# Patient Record
Sex: FEMALE | Race: WHITE | NOT HISPANIC OR LATINO | Employment: FULL TIME | URBAN - NONMETROPOLITAN AREA
[De-identification: names, ages, dates, MRNs, and addresses within clinical notes are randomized per-mention and may not be internally consistent; named-entity substitution may affect disease eponyms.]

---

## 2024-05-13 ENCOUNTER — OFFICE VISIT (OUTPATIENT)
Dept: URGENT CARE | Facility: CLINIC | Age: 39
End: 2024-05-13
Payer: COMMERCIAL

## 2024-05-13 VITALS
HEIGHT: 64 IN | TEMPERATURE: 97.7 F | HEART RATE: 86 BPM | DIASTOLIC BLOOD PRESSURE: 80 MMHG | BODY MASS INDEX: 38.76 KG/M2 | RESPIRATION RATE: 18 BRPM | SYSTOLIC BLOOD PRESSURE: 116 MMHG | OXYGEN SATURATION: 97 % | WEIGHT: 227 LBS

## 2024-05-13 DIAGNOSIS — H69.92 CHRONIC DYSFUNCTION OF LEFT EUSTACHIAN TUBE: ICD-10-CM

## 2024-05-13 DIAGNOSIS — J32.9 CHRONIC RHINOSINUSITIS: ICD-10-CM

## 2024-05-13 DIAGNOSIS — R05.3 CHRONIC COUGH: Primary | ICD-10-CM

## 2024-05-13 PROCEDURE — 99213 OFFICE O/P EST LOW 20 MIN: CPT | Performed by: PHYSICIAN ASSISTANT

## 2024-05-13 RX ORDER — RIZATRIPTAN BENZOATE 10 MG/1
10 TABLET ORAL
COMMUNITY
Start: 2024-03-29

## 2024-05-13 RX ORDER — MONTELUKAST SODIUM 10 MG/1
10 TABLET ORAL DAILY
Qty: 30 TABLET | Refills: 0 | Status: SHIPPED | OUTPATIENT
Start: 2024-05-13 | End: 2024-06-12

## 2024-05-13 RX ORDER — DULOXETIN HYDROCHLORIDE 30 MG/1
30 CAPSULE, DELAYED RELEASE ORAL DAILY
COMMUNITY
Start: 2023-05-29

## 2024-05-13 RX ORDER — IPRATROPIUM BROMIDE 42 UG/1
2 SPRAY, METERED NASAL 4 TIMES DAILY
Qty: 15 ML | Refills: 0 | Status: SHIPPED | OUTPATIENT
Start: 2024-05-13 | End: 2024-05-20

## 2024-05-13 RX ORDER — AZITHROMYCIN 250 MG/1
TABLET, FILM COATED ORAL
Qty: 6 TABLET | Refills: 0 | Status: SHIPPED | OUTPATIENT
Start: 2024-05-13 | End: 2024-05-18

## 2024-05-13 RX ORDER — DOXYCYCLINE 100 MG/1
TABLET ORAL
COMMUNITY
Start: 2023-06-05

## 2024-05-13 RX ORDER — ALBUTEROL SULFATE 90 UG/1
2 AEROSOL, METERED RESPIRATORY (INHALATION) EVERY 4 HOURS PRN
COMMUNITY
Start: 2024-04-03

## 2024-05-13 NOTE — PROGRESS NOTES
[facility] URGENT CARE   CARLOS NOTE:      Name: Amy Montano, 38 y.o.    CSN:[id number]   MRN:[id number]    PCP: No primary care provider on file.    ALL:    Allergies   Allergen Reactions    Amoxicillin Anaphylaxis       History:    Chief Complaint: Cough (COUGH x 2 MONTHS )    Encounter Date: 5/13/2024  18:25hrs    HPI: The history was obtained from the patient. Amy is a 38 y.o. female, who presents with a chief complaint of Cough (COUGH x 2 MONTHS )     Mentions she has been having this cough for about a year, she has been treated by her family physician in the past with this antibiotic which seems to be modestly improving, she denies any notable smell or taste changes, more recently in the past 2 months she is having increase in his coughing spells, normal PFT and discussion of using Flovent if needed.  Patient denies any hemoptysis, weight loss, night sweats, denies any gingival bleeding or swelling.  She is not yet to have a chest x-ray performed in the last year with this persistent/chronic cough.  She does note did endorse a tickle in the back of the throat which often causes her to cough, she also complains of sinus pressure primarily left-sided with some ear pressure and congestion with fullness often considered to be associated with fluid buildup.      Overall she is stating that she has a history of being exposed to mold in a apartment complex and [place], [place], she then states they moved to a form house with his carpet in the upper portions of split home, she does possess a dog as well as a cat.  She has a known history of allergies to cats.  She is adherent with her Zyrtec as well as her Flonase.    She denies any swelling of the legs arms or abdomen.    PMHx:    No past medical history on file.           Current Outpatient Medications   Medication Sig Dispense Refill    albuterol 90 mcg/actuation inhaler Inhale 2 puffs every 4 hours if needed.      doxycycline (Adoxa) 100 mg tablet  TAKE ONE TABLET BY MOUTH TWO TIMES A DAY FOR 10 DAYS      DULoxetine (Cymbalta) 30 mg DR capsule Take 1 capsule (30 mg) by mouth once daily.      rizatriptan (Maxalt) 10 mg tablet Take 1 tablet (10 mg) by mouth.      azithromycin (Zithromax) 250 mg tablet Take 2 tablets (500 mg) on  Day 1,  followed by 1 tablet (250 mg) once daily on Days 2 through 5. 6 tablet 0    ipratropium (Atrovent) 42 mcg (0.06 %) nasal spray Administer 2 sprays into each nostril 4 times a day for 7 days. 15 mL 0    montelukast (Singulair) 10 mg tablet Take 1 tablet (10 mg) by mouth once daily. 30 tablet 0     No current facility-administered medications for this visit.         PMSx:  No past surgical history on file.    Fam Hx: No family history on file.    SOC. Hx:     Social History     Socioeconomic History    Marital status: Single     Spouse name: Not on file    Number of children: Not on file    Years of education: Not on file    Highest education level: Not on file   Occupational History    Not on file   Tobacco Use    Smoking status: Not on file    Smokeless tobacco: Not on file   Substance and Sexual Activity    Alcohol use: Not on file    Drug use: Not on file    Sexual activity: Not on file   Other Topics Concern    Not on file   Social History Narrative    Not on file     Social Determinants of Health     Financial Resource Strain: Low Risk  (3/29/2024)    Received from [facility]    Overall Financial Resource Strain (CARDIA)     Difficulty of Paying Living Expenses: Not very hard   Food Insecurity: No Food Insecurity (3/29/2024)    Received from [facility]    Hunger Vital Sign     Worried About Running Out of Food in the Last Year: Never true     Ran Out of Food in the Last Year: Never true   Transportation Needs: No Transportation Needs (3/29/2024)    Received from [facility]    PRAPARE - Transportation     Lack of Transportation (Medical): No     Lack of Transportation (Non-Medical): No   Physical Activity:  Inactive (3/29/2024)    Received from [facility]    Exercise Vital Sign     Days of Exercise per Week: 0 days     Minutes of Exercise per Session: 0 min   Stress: Stress Concern Present (3/29/2024)    Received from [facility]    [employer] [place] [employer] - Occupational Stress Questionnaire     Feeling of Stress : To some extent   Social Connections: Moderately Isolated (3/29/2024)    Received from [facility]    Social Connection and Isolation Panel [NHANES]     Frequency of Communication with Friends and Family: More than three times a week     Frequency of Social Gatherings with Friends and Family: Once a week     Attends [employer] Services: Never     Active Member of Clubs or Organizations: No     Attends Club or Organization Meetings: Never     Marital Status: Living with partner   Intimate Partner Violence: Not on file   Housing Stability: Low Risk  (3/29/2024)    Received from [facility]    Housing Stability Vital Sign     Unable to Pay for Housing in the Last Year: No     Number of Places Lived in the Last Year: 2     Unstable Housing in the Last Year: No         Vitals:    05/13/24 1819   BP: 116/80   Pulse: 86   Resp: 18   Temp: 36.5 °C (97.7 °F)   SpO2: 97%     103 kg (227 lb)          Physical Exam  Vitals reviewed.   Constitutional:       Appearance: Normal appearance. She is obese.   HENT:      Head: Normocephalic and atraumatic.      Nose: Nose normal.      Mouth/Throat:      Mouth: Mucous membranes are moist.   Eyes:      Extraocular Movements: Extraocular movements intact.   Cardiovascular:      Rate and Rhythm: Normal rate and regular rhythm.   Pulmonary:      Effort: Pulmonary effort is normal.      Breath sounds: Normal breath sounds. No wheezing or rhonchi.      Comments: Transmitted abdominal sounds noted throughout the chest cavity.  Abdominal:      General: Abdomen is flat.   Musculoskeletal:         General: Normal range of motion.      Cervical back:  Normal range of motion and neck supple.   Skin:     General: Skin is warm.      Capillary Refill: Capillary refill takes less than 2 seconds.   Neurological:      Mental Status: She is alert and oriented to person, place, and time.   Psychiatric:         Behavior: Behavior normal.           LABORATORY @ RADIOLOGICAL IMAGING (if done):     2 view chest x-ray ordered will review results.    ____________________________________________________________________    I did personally review Amy's past medical history, surgical history, social history, as well as family history (when relevant).  In this case, I also oversaw the her drug management by reviewing her medication list, allergy list, as well as the medications that I prescribed during the UC course and/or recommended as an out-patient (including possible OTC medications such as acetaminophen, NSAIDs , etc).    After reviewing the items above, I did look at previous medical documentation, such as recent hospitalizations, office visits, and/or recent consultations with PCP/specialist.                          SDOH:   Another factor that I considered in Amy's care was her Social Determinants of Health (SDOH). During this UC encounter, she did not have social determinants of health. Those SDOH influencing Amy's care are: none      _____________________________________________________________________      UC COURSE/MEDICAL DECISION MAKING:    Amy is a 38 y.o., who presents with a working diagnosis of   1. Chronic cough    2. Chronic rhinosinusitis    3. Chronic dysfunction of left eustachian tube     with a differential to include: Influenza, parainfluenza, rhinovirus, adenovirus, metapneumovirus, coronavirus, COVID-19, postnasal drip, strep pharyngitis, GERD, retropharyngeal abscess, tonsillitis, adenitis, seasonal allergies, chronic sinusitis      Current plan is to add Singulair to treatment plan, I will hold off on introducing Flovent and maybe  allow GP to initiate if necessary.  Will order chest x-ray 2 view, patient had appropriate estimated PFTs, less likely some obstructive/restrictive condition could be extrapulmonary (ENT), consideration given to alpha-1 antitrypsin deficiency  Refer to ENT  Add ipratropium nasal spray to Flonase.  Add Singulair and milligrams  May consider fungal cause for condition, MRI from 10/20/2023 indicated paranasal sinuses/mastoid air cells are clear.        Adonay Lora PA-C   Advanced Practice Provider  [facility] URGENT CARE

## 2025-02-17 ENCOUNTER — APPOINTMENT (OUTPATIENT)
Dept: OBSTETRICS AND GYNECOLOGY | Facility: CLINIC | Age: 40
End: 2025-02-17

## 2025-02-17 VITALS
SYSTOLIC BLOOD PRESSURE: 112 MMHG | DIASTOLIC BLOOD PRESSURE: 78 MMHG | WEIGHT: 216 LBS | BODY MASS INDEX: 36.88 KG/M2 | HEIGHT: 64 IN

## 2025-02-17 DIAGNOSIS — N91.2 AMENORRHEA: Primary | ICD-10-CM

## 2025-02-17 LAB — CRL: 12.3 MM

## 2025-02-17 PROCEDURE — 3008F BODY MASS INDEX DOCD: CPT | Performed by: OBSTETRICS & GYNECOLOGY

## 2025-02-17 PROCEDURE — 99459 PELVIC EXAMINATION: CPT | Performed by: OBSTETRICS & GYNECOLOGY

## 2025-02-17 PROCEDURE — 99204 OFFICE O/P NEW MOD 45 MIN: CPT | Performed by: OBSTETRICS & GYNECOLOGY

## 2025-02-17 PROCEDURE — 76817 TRANSVAGINAL US OBSTETRIC: CPT | Performed by: OBSTETRICS & GYNECOLOGY

## 2025-02-17 PROCEDURE — 1036F TOBACCO NON-USER: CPT | Performed by: OBSTETRICS & GYNECOLOGY

## 2025-02-17 ASSESSMENT — PATIENT HEALTH QUESTIONNAIRE - PHQ9
1. LITTLE INTEREST OR PLEASURE IN DOING THINGS: NOT AT ALL
SUM OF ALL RESPONSES TO PHQ9 QUESTIONS 1 AND 2: 0
2. FEELING DOWN, DEPRESSED OR HOPELESS: NOT AT ALL

## 2025-02-17 ASSESSMENT — LIFESTYLE VARIABLES
HOW MANY STANDARD DRINKS CONTAINING ALCOHOL DO YOU HAVE ON A TYPICAL DAY: 1 OR 2
HOW OFTEN DO YOU HAVE SIX OR MORE DRINKS ON ONE OCCASION: LESS THAN MONTHLY
AUDIT-C TOTAL SCORE: 2
HOW OFTEN DO YOU HAVE A DRINK CONTAINING ALCOHOL: MONTHLY OR LESS
SKIP TO QUESTIONS 9-10: 0

## 2025-02-17 ASSESSMENT — PAIN SCALES - GENERAL: PAINLEVEL_OUTOF10: 0-NO PAIN

## 2025-02-17 NOTE — PROGRESS NOTES
Amy Montano is a 39 y.o. year old female patient.  PCP = No Assigned PCP Generic Provider, MD    Chief Complaint   Patient presents with    New Patient Visit     New patient here due to amenorrhea. Patient states her last period was around  to , she is unsure. She c/o nausea, vomiting, being lightheaded, having mild cramps and breast tenderness. Patient denies any vaginal bleeding. Patient did have a positive urine pregnancy test at her PCP.       HPI   Presents stating that she has not had a menstrual flow since December.  She has some nausea and vomiting and mild cramps and breast tenderness.  Denies any vaginal bleeding or abdominal pain.    OB History          2    Para   1    Term   1            AB   1    Living   1         SAB   1    IAB        Ectopic        Multiple        Live Births   1                 Past Medical History:   Diagnosis Date    Migraines        Past Surgical History:   Procedure Laterality Date     SECTION, LOW TRANSVERSE  2016       Review of Systems:   Constitutional: No fever or chills  Respiratory: No shortness of breath, or cough  Cardiovascular: No chest pain or syncope  Breasts: No masses, no nipple discharge  Gastrointestinal: No diarrhea, no abdominal pain  Genitourinary: No dysuria or frequency  Gynecology: Negative except as noted in history of present illness  All other: All other systems reviewed and negative for complaint    Medication Documentation Review Audit       Reviewed by Davian Grissom MD (Physician) on 25 at 1108      Medication Order Taking? Sig Documenting Provider Last Dose Status   albuterol 90 mcg/actuation inhaler [id number] Yes Inhale 2 puffs every 4 hours if needed. Historical Provider, MD  Active   DOCOSAHEXAENOIC ACID ORAL [id number] Yes Take 1 capsule by mouth once daily. Historical Provider, MD  Active   doxycycline (Adoxa) 100 mg tablet [id number]  TAKE ONE TABLET BY MOUTH TWO TIMES A DAY FOR 10 DAYS  "  Patient not taking: Reported on 2025    Historical Provider, MD  Active   DULoxetine (Cymbalta) 30 mg DR capsule   Take 1 capsule (30 mg) by mouth once daily.   Patient not taking: Reported on 2025    Historical Provider, MD  Active   ipratropium (Atrovent) 42 mcg (0.06 %) nasal spray   Administer 2 sprays into each nostril 4 times a day for 7 days.   Patient not taking: Reported on 2025    Adonay Lora PA-C   24 2359   montelukast (Singulair) 10 mg tablet   Take 1 tablet (10 mg) by mouth once daily.   Patient not taking: Reported on 2025    Adonay Lora PA-C   24 2359   rizatriptan (Maxalt) 10 mg tablet   Take 1 tablet (10 mg) by mouth.   Patient not taking: Reported on 2025    Historical Provider, MD  Active                     /78   Ht 1.626 m (5' 4\")   Wt 98 kg (216 lb)   LMP 2024 (Approximate)   BMI 37.08 kg/m²     PHYSICAL EXAMINATION:  Chaperone present for exam: Cecile Russell, LPN  Well-developed, well nourished, in no acute distress, alert and oriented x three, is pleasant and cooperative.  HEENT: Clear. Pupils equal, round and reactive to light and accommodation. Extraocular muscles are intact. Oral mucosa pink without exudate.   NECK: No lymphadenopathy, no thyromegaly.  LUNGS: Clear bilaterally.  HEART: Regular rate and rhythm without murmurs.  ABDOMEN: Normoactive bowel sounds, soft and nontender, no guarding or rebound tenderness, no CVA tenderness.  EXTREMITIES: No clubbing, cyanosis or edema.  NEUROLOGIC:  Cranial nerves II-XII grossly intact.  :  Normal external female genitalia, normal vulva, normal vagina. Normal urethral meatus, urethra and bladder.  Vaginal ultrasound shows a live intrauterine pregnancy at 7 weeks 3 days gestation which is consistent with LMP. EDC is 2025.      Problem List Items Addressed This Visit    None  Visit Diagnoses       Amenorrhea    -  " Primary    Relevant Orders    US OB transvaginal (Completed)             Provider Impression:  1.  Amenorrhea  Patient offered to be given Phenergan for her nausea which she declines at this time.  Follow-up in 4 weeks for new OB visit, cultures and prenatal labs.

## 2025-03-17 ENCOUNTER — APPOINTMENT (OUTPATIENT)
Facility: CLINIC | Age: 40
End: 2025-03-17
Payer: COMMERCIAL

## 2025-03-17 VITALS — SYSTOLIC BLOOD PRESSURE: 116 MMHG | WEIGHT: 214.6 LBS | BODY MASS INDEX: 36.84 KG/M2 | DIASTOLIC BLOOD PRESSURE: 68 MMHG

## 2025-03-17 DIAGNOSIS — Z12.4 ENCOUNTER FOR SCREENING FOR CERVICAL CANCER: ICD-10-CM

## 2025-03-17 DIAGNOSIS — Z11.3 SCREEN FOR STD (SEXUALLY TRANSMITTED DISEASE): ICD-10-CM

## 2025-03-17 DIAGNOSIS — Z01.419 ENCOUNTER FOR ANNUAL ROUTINE GYNECOLOGICAL EXAMINATION: ICD-10-CM

## 2025-03-17 DIAGNOSIS — Z3A.11 11 WEEKS GESTATION OF PREGNANCY (HHS-HCC): Primary | ICD-10-CM

## 2025-03-17 PROCEDURE — 0500F INITIAL PRENATAL CARE VISIT: CPT | Performed by: OBSTETRICS & GYNECOLOGY

## 2025-03-17 PROCEDURE — 88175 CYTOPATH C/V AUTO FLUID REDO: CPT

## 2025-03-17 NOTE — PROGRESS NOTES
Subjective   Patient ID [id number]   Amy Montano is a 39 y.o.  at 11w5d with a working estimated date of delivery of 10/1/2025, by Last Menstrual Period who presents for an initial prenatal visit.     Chief Complaint   Patient presents with    Initial Prenatal Visit     Patient has no concerns at this time.        Her pregnancy is complicated by:  She had previous  section.    OB History    Para Term  AB Living   3 1 1   1 1   SAB IAB Ectopic Multiple Live Births   1       1      # Outcome Date GA Lbr Tony/2nd Weight Sex Type Anes PTL Lv   3 Current            2 SAB 2018           1 Term 16 40w0d   M CS-LTranv   CACHORRO          Review of Systems:   Constitutional: No fever or chills  Respiratory: No shortness of breath, or cough  Cardiovascular: No chest pain or syncope  Breasts: No masses, no nipple discharge  Gastrointestinal: No diarrhea, no abdominal pain  Genitourinary: No dysuria or frequency  Gynecology: Negative except as noted in history of present illness  All other: All other systems reviewed and negative for complaint    Objective   Chaperone present for exam: Cecile Russell LPN    Physical Exam  Weight: 97.3 kg (214 lb 9.6 oz)  Expected Total Weight Gain: 5 kg (11 lb)-9 kg (19 lb)   Pregravid BMI: 37.06  BP: 116/68    Fetal Heart Rate: 150     OBGyn Exam  Well-developed, well nourished, in no acute distress, alert and oriented x three, is pleasant and cooperative.  HEENT: Clear. Pupils equal, round and reactive to light and accommodation. Extraocular muscles are intact. Oral mucosa pink without exudate.   NECK: No lymphadenopathy, no thyromegaly.  LUNGS: Clear bilaterally.  HEART: Regular rate and rhythm without murmurs.  ABDOMEN: Normoactive bowel sounds, soft and nontender, no guarding or rebound tenderness, no CVA tenderness.  EXTREMITIES: No clubbing, cyanosis or edema.  NEUROLOGIC:  Cranial nerves II-XII grossly intact.  :  Normal external female genitalia,  normal vulva, normal vagina. Normal urethral meatus, urethra and bladder. Normal appearing cervix.  Approximately 11-week size uterus, no adnexal masses or tenderness. Pap smear performed today.      Assessment/Plan   Problem List Items Addressed This Visit    None  Visit Diagnoses       11 weeks gestation of pregnancy ([facility])    -  Primary    Relevant Orders    Urine culture    Encounter for annual routine gynecological examination        Screen for STD (sexually transmitted disease)        Relevant Orders    C. trachomatis / N. gonorrhoeae, Amplified, Urogenital    Encounter for screening for cervical cancer        Relevant Orders    THINPREP PAP TEST            She declines Prequel genetic testing.  Follow up in 4 weeks for return OB visit.

## 2025-03-19 LAB
BACTERIA UR CULT: NORMAL
C TRACH RRNA SPEC QL NAA+PROBE: NOT DETECTED
N GONORRHOEA RRNA SPEC QL NAA+PROBE: NOT DETECTED
QUEST GC CT AMPLIFIED (ALWAYS MESSAGE): NORMAL

## 2025-04-01 ENCOUNTER — TELEPHONE (OUTPATIENT)
Facility: CLINIC | Age: 40
End: 2025-04-01
Payer: COMMERCIAL

## 2025-04-01 LAB
CYTOLOGY CMNT CVX/VAG CYTO-IMP: NORMAL
LAB AP HPV GENOTYPE QUESTION: YES
LAB AP HPV HR: NORMAL
LABORATORY COMMENT REPORT: NORMAL
LMP START DATE: NORMAL
MENSTRUAL HX REPORTED: NORMAL
PATH REPORT.TOTAL CANCER: NORMAL

## 2025-04-01 NOTE — TELEPHONE ENCOUNTER
----- Message from Davian Grissom sent at 4/1/2025 11:36 AM EDT -----  Please inform patient that her Pap smear was normal.  There is noted possible bacterial vaginosis which can be treated with Flagyl if she is symptomatic.

## 2025-04-14 ENCOUNTER — APPOINTMENT (OUTPATIENT)
Facility: CLINIC | Age: 40
End: 2025-04-14
Payer: COMMERCIAL

## 2025-04-14 VITALS — WEIGHT: 208.7 LBS | SYSTOLIC BLOOD PRESSURE: 116 MMHG | DIASTOLIC BLOOD PRESSURE: 82 MMHG | BODY MASS INDEX: 35.82 KG/M2

## 2025-04-14 DIAGNOSIS — O21.9 NAUSEA AND VOMITING IN PREGNANCY PRIOR TO 22 WEEKS GESTATION: ICD-10-CM

## 2025-04-14 DIAGNOSIS — Z3A.15 15 WEEKS GESTATION OF PREGNANCY (HHS-HCC): Primary | ICD-10-CM

## 2025-04-14 PROCEDURE — 0501F PRENATAL FLOW SHEET: CPT | Performed by: OBSTETRICS & GYNECOLOGY

## 2025-04-14 RX ORDER — ONDANSETRON HYDROCHLORIDE 8 MG/1
8 TABLET, FILM COATED ORAL EVERY 8 HOURS PRN
Qty: 20 TABLET | Refills: 5 | Status: SHIPPED | OUTPATIENT
Start: 2025-04-14 | End: 2025-05-14

## 2025-04-14 NOTE — PROGRESS NOTES
Subjective   Patient ID [id number]   Amy Montano is a 39 y.o.  at 15w5d with a working estimated date of delivery of 10/1/2025, by Last Menstrual Period who presents for a routine prenatal visit. She denies vaginal bleeding, leakage of fluid, decreased fetal movements, or contractions.  She still has nausea symptoms and is interested in medication for the nausea.    Chief Complaint   Patient presents with    Routine Prenatal Visit          Objective   Physical Exam  Weight: 94.7 kg (208 lb 11.2 oz)  Expected Total Weight Gain: 5 kg (11 lb)-9 kg (19 lb)   Pregravid BMI: 37.06  BP: 116/82  Fetal Heart Rate: 150 Fundal Height (cm): 15 cm      Prenatal Labs      Assessment/Plan   Problem List Items Addressed This Visit    None  Visit Diagnoses       15 weeks gestation of pregnancy ([facility])    -  Primary    Relevant Orders    US OB 14+ weeks anatomy scan    Nausea and vomiting in pregnancy prior to 22 weeks gestation        Relevant Medications    ondansetron (Zofran) 8 mg tablet            Continue prenatal vitamin.  Labs reviewed.    Prescription for Zofran for the nausea.  Follow up in 4 weeks for a routine prenatal visit.

## 2025-05-19 ENCOUNTER — APPOINTMENT (OUTPATIENT)
Facility: CLINIC | Age: 40
End: 2025-05-19
Payer: COMMERCIAL

## 2025-05-19 ENCOUNTER — HOSPITAL ENCOUNTER (OUTPATIENT)
Dept: RADIOLOGY | Facility: HOSPITAL | Age: 40
Discharge: HOME | End: 2025-05-19
Payer: COMMERCIAL

## 2025-05-19 ENCOUNTER — TELEPHONE (OUTPATIENT)
Facility: CLINIC | Age: 40
End: 2025-05-19

## 2025-05-19 VITALS — SYSTOLIC BLOOD PRESSURE: 118 MMHG | BODY MASS INDEX: 35.94 KG/M2 | WEIGHT: 209.4 LBS | DIASTOLIC BLOOD PRESSURE: 70 MMHG

## 2025-05-19 DIAGNOSIS — Z3A.20 20 WEEKS GESTATION OF PREGNANCY (HHS-HCC): Primary | ICD-10-CM

## 2025-05-19 DIAGNOSIS — O21.9 NAUSEA AND VOMITING IN PREGNANCY PRIOR TO 22 WEEKS GESTATION: ICD-10-CM

## 2025-05-19 DIAGNOSIS — Z3A.15 15 WEEKS GESTATION OF PREGNANCY (HHS-HCC): ICD-10-CM

## 2025-05-19 PROCEDURE — 76805 OB US >/= 14 WKS SNGL FETUS: CPT | Performed by: RADIOLOGY

## 2025-05-19 PROCEDURE — 76805 OB US >/= 14 WKS SNGL FETUS: CPT

## 2025-05-19 PROCEDURE — 0501F PRENATAL FLOW SHEET: CPT | Performed by: OBSTETRICS & GYNECOLOGY

## 2025-05-19 RX ORDER — ONDANSETRON 8 MG/1
8 TABLET, ORALLY DISINTEGRATING ORAL EVERY 8 HOURS PRN
Qty: 20 TABLET | Refills: 2 | Status: SHIPPED | OUTPATIENT
Start: 2025-05-19

## 2025-05-19 RX ORDER — ONDANSETRON 8 MG/1
8 TABLET, ORALLY DISINTEGRATING ORAL EVERY 8 HOURS PRN
COMMUNITY
End: 2025-05-19 | Stop reason: SDUPTHER

## 2025-05-19 NOTE — RESULT ENCOUNTER NOTE
Anatomy scan is normal overall.  The heart could not be visualized well.  Therefore, recommend follow-up ultrasound in 2 weeks.  Please inform patient and schedule follow-up ultrasound.

## 2025-05-19 NOTE — TELEPHONE ENCOUNTER
----- Message from Davian Grissom sent at 5/19/2025 12:08 PM EDT -----  Anatomy scan is normal overall.  The heart could not be visualized well.  Therefore, recommend follow-up ultrasound in 2 weeks.  Please inform patient and schedule follow-up ultrasound.  ----- Message -----  From: Interface, Radiology Results In  Sent: 5/19/2025  12:01 PM EDT  To: Davian Grissom MD

## 2025-05-19 NOTE — PROGRESS NOTES
"Subjective   Patient ID [id number]   Amy Montano is a 39 y.o.  at 20w5d with a working estimated date of delivery of 10/1/2025, by Last Menstrual Period who presents for a routine prenatal visit. She denies vaginal bleeding, leakage of fluid, decreased fetal movements, or contractions.    Chief Complaint   Patient presents with    Routine Prenatal Visit     Patient c/o severe H/A, denies any vision changes. Patient states she has a history of Migrans. Patient also states she is having nausea again and would like dissolving Zofran.          Objective   Physical Exam  Weight: 95 kg (209 lb 6.4 oz)  Expected Total Weight Gain: 5 kg (11 lb)-9 kg (19 lb)   Pregravid BMI: 37.06  BP: 118/70  Fetal Heart Rate: 150 Fundal Height (cm): 20 cm      Prenatal Labs    No results found for: \"HGB\", \"HCT\", \"LABPLAT\", \"ABO\", \"LABRHF\", \"LABANTI\", \"LABRPR\", \"HEPBSAG\", \"HIVAB\"    Assessment/Plan   Problem List Items Addressed This Visit    None  Visit Diagnoses         20 weeks gestation of pregnancy ([facility])    -  Primary      Nausea and vomiting in pregnancy prior to 22 weeks gestation        Relevant Medications    ondansetron ODT (Zofran-ODT) 8 mg disintegrating tablet            Continue prenatal vitamin.  Labs reviewed.    Prescription for the orally dissolving Zofran sent to the pharmacy due to nausea.  Follow up in 3 weeks for a routine prenatal visit.  "

## 2025-06-02 ENCOUNTER — HOSPITAL ENCOUNTER (OUTPATIENT)
Dept: RADIOLOGY | Facility: HOSPITAL | Age: 40
Discharge: HOME | End: 2025-06-02
Payer: COMMERCIAL

## 2025-06-02 DIAGNOSIS — Z3A.20 20 WEEKS GESTATION OF PREGNANCY (HHS-HCC): ICD-10-CM

## 2025-06-02 PROCEDURE — 76816 OB US FOLLOW-UP PER FETUS: CPT

## 2025-06-02 PROCEDURE — 76816 OB US FOLLOW-UP PER FETUS: CPT | Performed by: RADIOLOGY

## 2025-06-10 ENCOUNTER — APPOINTMENT (OUTPATIENT)
Facility: CLINIC | Age: 40
End: 2025-06-10
Payer: COMMERCIAL

## 2025-06-10 VITALS — BODY MASS INDEX: 36.82 KG/M2 | DIASTOLIC BLOOD PRESSURE: 78 MMHG | SYSTOLIC BLOOD PRESSURE: 112 MMHG | WEIGHT: 214.5 LBS

## 2025-06-10 DIAGNOSIS — Z3A.23 23 WEEKS GESTATION OF PREGNANCY (HHS-HCC): Primary | ICD-10-CM

## 2025-06-10 PROCEDURE — 0501F PRENATAL FLOW SHEET: CPT | Performed by: OBSTETRICS & GYNECOLOGY

## 2025-06-10 ASSESSMENT — EDINBURGH POSTNATAL DEPRESSION SCALE (EPDS)
I HAVE BEEN SO UNHAPPY THAT I HAVE BEEN CRYING: YES, QUITE OFTEN
I HAVE FELT SAD OR MISERABLE: NOT VERY OFTEN
I HAVE BEEN ANXIOUS OR WORRIED FOR NO GOOD REASON: HARDLY EVER
I HAVE BLAMED MYSELF UNNECESSARILY WHEN THINGS WENT WRONG: NOT VERY OFTEN
THE THOUGHT OF HARMING MYSELF HAS OCCURRED TO ME: NEVER
I HAVE BEEN ABLE TO LAUGH AND SEE THE FUNNY SIDE OF THINGS: NOT QUITE SO MUCH NOW
TOTAL SCORE: 10
I HAVE LOOKED FORWARD WITH ENJOYMENT TO THINGS: AS MUCH AS I EVER DID
THINGS HAVE BEEN GETTING ON TOP OF ME: NO, MOST OF THE TIME I HAVE COPED QUITE WELL
I HAVE BEEN SO UNHAPPY THAT I HAVE HAD DIFFICULTY SLEEPING: NOT VERY OFTEN
I HAVE FELT SCARED OR PANICKY FOR NO GOOD REASON: YES, SOMETIMES

## 2025-06-10 NOTE — PROGRESS NOTES
"Subjective   Patient ID [id number]   Amy Montano is a 39 y.o.  at 23w6d with a working estimated date of delivery of 10/1/2025, by Last Menstrual Period who presents for a routine prenatal visit. She denies vaginal bleeding, leakage of fluid, decreased fetal movements, or contractions.  Zofran helps with nausea most of the time.    Chief Complaint   Patient presents with    Routine Prenatal Visit     23w6d EDPS/1hr glucose paper given. Pt states nausea has become alittle worse and just feeling emotional          Objective   Physical Exam  Weight: 97.3 kg (214 lb 8 oz)  Expected Total Weight Gain: 5 kg (11 lb)-9 kg (19 lb)   Pregravid BMI: 37.06  BP: 112/78  Fetal Heart Rate: 148 Fundal Height (cm): 23 cm      Prenatal Labs    No results found for: \"HGB\", \"HCT\", \"LABPLAT\", \"ABO\", \"LABRHF\", \"LABANTI\", \"LABRPR\", \"HEPBSAG\", \"HIVAB\"    Assessment/Plan   Problem List Items Addressed This Visit    None  Visit Diagnoses         23 weeks gestation of pregnancy ([facility])    -  Primary    Relevant Orders    CBC Anemia Panel With Reflex,Pregnancy    Glucose, 1 Hour Screen, Pregnancy            Continue prenatal vitamin.  Labs reviewed.    She is encouraged to continue using the Zofran for the nausea.  Follow up in 4 weeks for a routine prenatal visit.  "

## 2025-06-13 ENCOUNTER — TELEPHONE (OUTPATIENT)
Facility: CLINIC | Age: 40
End: 2025-06-13
Payer: COMMERCIAL

## 2025-06-13 DIAGNOSIS — B37.31 YEAST VAGINITIS: Primary | ICD-10-CM

## 2025-06-13 RX ORDER — TERCONAZOLE 4 MG/G
1 CREAM VAGINAL NIGHTLY
Qty: 45 G | Refills: 0 | Status: SHIPPED | OUTPATIENT
Start: 2025-06-13 | End: 2025-06-20

## 2025-06-13 NOTE — TELEPHONE ENCOUNTER
Pt called in concerned that she might have a yeast infection. Pt denies any discharge but has noticed bumps inside vaginal wall and a lot of irritation. Pt stated this is normally how they start out and she's prone to getting yeast infections.

## 2025-06-27 ENCOUNTER — LAB (OUTPATIENT)
Dept: LAB | Facility: HOSPITAL | Age: 40
End: 2025-06-27
Payer: COMMERCIAL

## 2025-06-27 LAB
ERYTHROCYTE [DISTWIDTH] IN BLOOD BY AUTOMATED COUNT: 13.8 % (ref 11.5–14.5)
HCT VFR BLD AUTO: 33.7 % (ref 36–46)
HGB BLD-MCNC: 11 G/DL (ref 12–16)
MCH RBC QN AUTO: 29.7 PG (ref 26–34)
MCHC RBC AUTO-ENTMCNC: 32.6 G/DL (ref 32–36)
MCV RBC AUTO: 91 FL (ref 80–100)
NRBC BLD-RTO: 0 /100 WBCS (ref 0–0)
PLATELET # BLD AUTO: 221 X10*3/UL (ref 150–450)
RBC # BLD AUTO: 3.7 X10*6/UL (ref 4–5.2)
WBC # BLD AUTO: 11.5 X10*3/UL (ref 4.4–11.3)

## 2025-06-27 PROCEDURE — 85027 COMPLETE CBC AUTOMATED: CPT

## 2025-06-28 LAB
GLUCOSE 1H P 50 G GLC PO SERPL-MCNC: 81 MG/DL
REFLEX ADDED, ANEMIA PANEL: NORMAL

## 2025-07-08 ENCOUNTER — APPOINTMENT (OUTPATIENT)
Facility: CLINIC | Age: 40
End: 2025-07-08
Payer: COMMERCIAL

## 2025-07-08 VITALS — SYSTOLIC BLOOD PRESSURE: 116 MMHG | DIASTOLIC BLOOD PRESSURE: 74 MMHG | WEIGHT: 213.5 LBS | BODY MASS INDEX: 36.65 KG/M2

## 2025-07-08 DIAGNOSIS — Z3A.27 27 WEEKS GESTATION OF PREGNANCY (HHS-HCC): Primary | ICD-10-CM

## 2025-07-08 PROCEDURE — 0501F PRENATAL FLOW SHEET: CPT | Performed by: OBSTETRICS & GYNECOLOGY

## 2025-07-08 NOTE — PROGRESS NOTES
Subjective   Patient ID [id number]   Amy Montano is a 39 y.o.  at 27w6d with a working estimated date of delivery of 10/1/2025, by Last Menstrual Period who presents for a routine prenatal visit. She denies vaginal bleeding, leakage of fluid, decreased fetal movements, or contractions.  Bladder leakage after her last child and has had similar leakage of urine over the last several weeks with this pregnancy.    Chief Complaint   Patient presents with    Routine Prenatal Visit     27w6d  pt has had cough, and states some leakage, but thinks that is due to the baby pressing on bladder          Objective   Physical Exam  Weight: 96.8 kg (213 lb 8 oz)  Expected Total Weight Gain: 5 kg (11 lb)-9 kg (19 lb)   Pregravid BMI: 37.06  BP: 116/74           Prenatal Labs    Lab Results   Component Value Date    HGB 11.0 (L) 2025    HCT 33.7 (L) 2025       Assessment/Plan   Problem List Items Addressed This Visit    None  Visit Diagnoses         27 weeks gestation of pregnancy ([facility])    -  Primary            Continue prenatal vitamin.  Labs reviewed.    Follow up in 2 weeks for a routine prenatal visit.

## 2025-07-22 ENCOUNTER — APPOINTMENT (OUTPATIENT)
Facility: CLINIC | Age: 40
End: 2025-07-22
Payer: COMMERCIAL

## 2025-07-22 VITALS — DIASTOLIC BLOOD PRESSURE: 70 MMHG | SYSTOLIC BLOOD PRESSURE: 114 MMHG | WEIGHT: 213.2 LBS | BODY MASS INDEX: 36.6 KG/M2

## 2025-07-22 DIAGNOSIS — Z3A.29 29 WEEKS GESTATION OF PREGNANCY (HHS-HCC): Primary | ICD-10-CM

## 2025-07-22 PROCEDURE — 0501F PRENATAL FLOW SHEET: CPT | Performed by: OBSTETRICS & GYNECOLOGY

## 2025-07-22 NOTE — PROGRESS NOTES
Subjective   Patient ID [id number]   Amy Montano is a 39 y.o.  at 29w6d with a working estimated date of delivery of 10/1/2025, by Last Menstrual Period who presents for a routine prenatal visit. She denies vaginal bleeding, leakage of fluid, decreased fetal movements, or contractions.    Chief Complaint   Patient presents with    Routine Prenatal Visit     Patient has some concerns regarding OTC cough medication she took for about a week. Patient feels fetal movement and was given info on counting the kicks.          Objective   Physical Exam  Weight: 96.7 kg (213 lb 3.2 oz)  Expected Total Weight Gain: 5 kg (11 lb)-9 kg (19 lb)   Pregravid BMI: 37.06  BP: 114/70  Fetal Heart Rate: 144 Fundal Height (cm): 29 cm      Prenatal Labs    Lab Results   Component Value Date    HGB 11.0 (L) 2025    HCT 33.7 (L) 2025       Assessment/Plan   Problem List Items Addressed This Visit    None  Visit Diagnoses         29 weeks gestation of pregnancy ([facility])    -  Primary            Continue prenatal vitamin.  Labs reviewed.    Follow up in 2 weeks for a routine prenatal visit.

## 2025-08-05 ENCOUNTER — APPOINTMENT (OUTPATIENT)
Facility: CLINIC | Age: 40
End: 2025-08-05
Payer: COMMERCIAL

## 2025-08-19 ENCOUNTER — APPOINTMENT (OUTPATIENT)
Facility: CLINIC | Age: 40
End: 2025-08-19
Payer: COMMERCIAL